# Patient Record
Sex: FEMALE | Race: WHITE | NOT HISPANIC OR LATINO | Employment: OTHER | URBAN - NONMETROPOLITAN AREA
[De-identification: names, ages, dates, MRNs, and addresses within clinical notes are randomized per-mention and may not be internally consistent; named-entity substitution may affect disease eponyms.]

---

## 2018-01-01 ENCOUNTER — HOSPITAL ENCOUNTER (EMERGENCY)
Facility: HOSPITAL | Age: 73
End: 2018-08-04
Attending: EMERGENCY MEDICINE | Admitting: EMERGENCY MEDICINE

## 2018-01-01 VITALS — TEMPERATURE: 96 F

## 2018-01-01 DIAGNOSIS — I46.9 CARDIAC ARREST (HCC): Primary | ICD-10-CM

## 2018-01-01 LAB
GLUCOSE BLDC GLUCOMTR-MCNC: 394 MG/DL (ref 70–130)
GLUCOSE BLDC GLUCOMTR-MCNC: 46 MG/DL (ref 70–130)

## 2018-01-01 PROCEDURE — 25010000002 EPINEPHRINE PF 1 MG/10ML SOLUTION PREFILLED SYRINGE: Performed by: EMERGENCY MEDICINE

## 2018-01-01 PROCEDURE — 82962 GLUCOSE BLOOD TEST: CPT

## 2018-01-01 PROCEDURE — 99284 EMERGENCY DEPT VISIT MOD MDM: CPT

## 2018-01-01 PROCEDURE — 94799 UNLISTED PULMONARY SVC/PX: CPT

## 2018-01-01 PROCEDURE — 92950 HEART/LUNG RESUSCITATION CPR: CPT

## 2018-01-01 PROCEDURE — 96374 THER/PROPH/DIAG INJ IV PUSH: CPT

## 2018-01-01 RX ORDER — DEXTROSE MONOHYDRATE 25 G/50ML
INJECTION, SOLUTION INTRAVENOUS
Status: DISCONTINUED | OUTPATIENT
Start: 2018-01-01 | End: 2018-01-01 | Stop reason: HOSPADM

## 2018-01-01 RX ADMIN — DEXTROSE MONOHYDRATE 25 ML: 25 INJECTION, SOLUTION INTRAVENOUS at 19:11

## 2018-01-01 RX ADMIN — EPINEPHRINE 1 MG: 0.1 INJECTION, SOLUTION ENDOTRACHEAL; INTRACARDIAC; INTRAVENOUS at 19:08

## 2018-08-05 NOTE — ED NOTES
I spoke with  Mo Haines. He states to release the body to a  home. Family has requested  services. He is in route to the hospital. [facility] patient rep is with the family.      Cristopher Celeste, RN  18

## 2018-08-05 NOTE — ED PROVIDER NOTES
Subjective   68 y/o female arrives via EMS in cardiac arrest. Per EMS they were called as patient collapsed in front of family. Per EMS patient was down for around 20 minutes prior to their arrival. They state they arrived at around 1634 to find the patient apneic and pulseless and began ACLS and BLS with 5 rounds of epi given. They found the patient to have one episode of v-tach and shock was given as well as amio with return to PEA. Patient was intubated in field and arrives with PEA arrest on monitor, ACLS and BLS continued.         Family, social and past history reviewed as below, prior documentation of H and Ps and other documentation are reviewed:    Unknown    Unknown    Social History    Marital status: Unknown             Spouse name:                       Years of education:                 Number of children:               Occupational History    None on file    Social History Main Topics    Smoking status: Not on file                          Smokeless tobacco: Not on file                       Alcohol use: Not on file     Drug use: Unknown    Sexual activity: Not on file          Other Topics            Concern    None on file    Social History Narrative    None on file        Family history: reviewed and non contributory             Review of Systems   Unable to perform ROS: Intubated       No past medical history on file.    Not on File    No past surgical history on file.    No family history on file.    Social History     Social History   • Marital status: Unknown     Social History Main Topics   • Drug use: Unknown     Other Topics Concern   • Not on file           Objective   Physical Exam   Constitutional: She appears well-developed.   HENT:   Head: Normocephalic and atraumatic.   Nose: Nose normal.   Eyes:   Fixed and dilated   Neck:   jvd   Cardiovascular:   No spontaneous pulse   Pulmonary/Chest:   Equal breath sounds with BVM, no spontaneous respirations.     Rales heard   Abdominal: Soft.    Musculoskeletal: Normal range of motion. She exhibits no edema or deformity.   Neurological:   GCS 3T   Skin: Skin is warm and dry.   Vitals reviewed.      Procedures           ED Course      Code continued, accucheck showed BS of 49, amp given and re-check 394. Further epi given and bedside US showed no cardiac activity. Code called at 1913. Patient received a total of 6 epi counting ours and EMS without ROSC.     DA arrived stating patient was ill since Friday and began to vomit today. States she fell to the bed and became apneic. Family called 911 at that time. Unknown if family iniated bystander CPR or rescue breathing.               MDM      Final diagnoses:   Cardiac arrest (CMS/[facility])            Wallace Le MD  08/04/18 1932

## 2018-08-05 NOTE — ED NOTES
FAMILY STILL INDECISIVE ABOUT  HOME OPTION. PT TRANSPORTED TO [facility]. DEATH CERTIFICATE GIVEN TO LAB PERSONNEL.        Cristopher Celeste, RN  [phone]